# Patient Record
(demographics unavailable — no encounter records)

---

## 2025-01-28 NOTE — HISTORY OF PRESENT ILLNESS
[FreeTextEntry1] : Ms. CASTORENA is a 32-year-old female who returns for follow-up endocrine reevaluation with regard to hormonal evaluation. Prior evaluation per her gyn has shown elevated serum Testosterone and elevated DHEAS.   Additional medical history includes that of iron deficiency, vitamin D deficiency, hirsutism, hair thinning  ROS: Feels fine but has issues falling asleep. Melatonin not helpful.  She has d/c Metformin Er two 750 mg Er tabs daily. She is also off OCP agent.  Labs 09/04/24 show TSH low at 0.18, other TFTs wnl. SHBG was low at 10.5 DHEAS elevated at 952 LH at 7.3 FSH at 3.6 Prl at 12.1 Testosterone Total elevated at 72.6 with Free at 0.9  She denies change in voice or increase in muscle mass.  She too does have hx of gestational DM when she was pregnant with her daughter 9 years ago. The diabetes was controlled through diet and did not require the use of medication. Birth weight was 7.14 lbs. No other children thereafter. She denies a history of PreDM. Since December 2023 she has been having sporadic episodes of sharp abd pain and lightheadedness with fatigue. These episodes are also associated with hot/cold flashes. She usually tries to have a snack/meal at these times, and her sx would improve within an hour. The patient does not have a pcp.   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  Removed IUD in 2024 after having it for 8.5 years. Did get a menses after but with very light flow- not as before. Prior to IUD, menses were very regular. Had pelvic and transvaginal US in April 2024 unremarkable for ovarian cysts. She continues to get menses with the most recent a few days ago and the previous ending on 01/01/25.  Patient also reports hair thinning, mostly in the temporal region of her head. She denies decreased hair volume over the years but notes that her hair falls out a lot in those areas. She notes of some hair growth on her chin.  She denies any acne issues.  Weight has been fairly stable over the years. Her current weight is 128 pounds, previously 126 pounds in Sept 2024. She denies any abnormal muscle growth. She does exercise at the gym up to 3x weekly.   Patient states she tries to follow dietary discretion. Her biggest struggle is controlling her large amount of fruit intake.  - Breakfast: eggs, fruits, or linares + egg + cheese on a roll/everything bagel (whenever she is rushing to work) - sometimes has fast food  Patient c/o frequent urination whenever she drinks water. States she would use the bathroom 3-4x within 30 mins of drinking 8oz of water. She would avoid drinking water for this reason. Does note improvement in this issue since she started going to the gym.   FHx: No known family history of hormonal imbalance. Parents are alive and healthy to her knowledge.   ____________________________________________________________________________________________________ She had an Abdominal MRI on 9/14/24 and was found to have a focal nodular hyperplasia. Her hepatologist was not concerned as the patient was not experiencing any major symptoms.  Abd MRI from 09/19/24 shows normal adrenal glands.   Medications:  - Has never taken Berberine - She tried Ovasitol in the past but was not consistent with taking it  Supplements: vitamin D3 5000 IU intermittently, Vitron-C  SocHx: denies cigarette smoking but vapes. Has maybe 3 alcoholic beverages yearly.  Works as a pediatric .   FHx: Denies FHx or Dm or thyroid disorder

## 2025-01-28 NOTE — ADDENDUM
[FreeTextEntry1] : Blood will be drawn in office today.  This note was written by Gloria Foster on 01/28/2025 acting as medical scribe for Dr. Presley Bright. I, Dr. Presley Bright, have read and attest that all the information, medical decision making and discharge instructions within are true and accurate.